# Patient Record
Sex: FEMALE | Race: WHITE | NOT HISPANIC OR LATINO | Employment: OTHER | URBAN - METROPOLITAN AREA
[De-identification: names, ages, dates, MRNs, and addresses within clinical notes are randomized per-mention and may not be internally consistent; named-entity substitution may affect disease eponyms.]

---

## 2021-04-27 NOTE — PROGRESS NOTES
Assessment/Plan:  Here for annual check due to history of breast cancer  C/o vaginal dryness with coitus, rec lubricants (none to date)  Normal breast and pelvic exams, no further paps needed, discussed recommendations  Dexa with PCP  May be due for colonoscopy, will call GI provider for recommendations  Personal history of breast cancer  No change in SBE or CBE  Right mammo order given    Vaginal atrophy  Has not tried lubricants, options given for OTC preparations  Would not encourage vaginal estrogen at this time due to h/o breast cancer       Diagnoses and all orders for this visit:    Personal history of breast cancer    Encounter for gynecological examination (general) (routine) without abnormal findings    Visit for screening mammogram  -     Mammo screening right w 3d & cad; Future    Vaginal atrophy    Other orders  -     aspirin 325 mg tablet; Take 325 mg by mouth daily  -     Omega-3 Fatty Acids (fish oil) 1,000 mg; Take 1,000 mg by mouth daily  -     co-enzyme Q-10 30 MG capsule; Take 30 mg by mouth 3 (three) times a day  -     Cholecalciferol (Vitamin D3) 1 25 MG (86106 UT) CAPS; Take by mouth  -     Fexofenadine HCl (ALLEGRA ALLERGY PO); Take by mouth  -     multivitamin (THERAGRAN) TABS; Take 1 tablet by mouth daily  -     cholestyramine (QUESTRAN) 4 g packet; Take 1 packet by mouth 3 (three) times a day with meals  -     losartan (COZAAR) 25 mg tablet  -     FLUoxetine (PROzac) 20 mg capsule          Subjective:      Patient ID: Jorge Cuevas is a 72 y o  female  HPI Presents for wellness  The following portions of the patient's history were reviewed and updated as appropriate: allergies, current medications, past family history, past medical history, past social history, past surgical history and problem list     Review of Systems  No breast, bladder, bowel changes  No new persistent pain, bloating, early satiety or pelvic pressure  c/o vaginal dryness      Objective:      /80 Ht 5' 0 5" (1 537 m)   Wt 79 4 kg (175 lb)   BMI 33 61 kg/m²          Physical Exam  General appearance: no distress, pleasant  Neck: thyroid without nodules or thyromegaly, no palpable adenopathy  Lymph nodes: no palpable adenopathy  Breasts: no masses, nodes or skin changes  S/p left mastectomy with islas flap reconstruction    Abdomen: soft, non tender, no palpable masses; well healed islas flap scars  Pelvic exam: normal atrophic external genitalia, urethral meatus normal, vagina atrophic without lesions, cervix atrophic without lesions, uterus small, non tender, no adnexal masses, non tender  Rectal exam: normal sphincter tone, no masses, RV confirms above

## 2021-04-28 RX ORDER — FLUOXETINE HYDROCHLORIDE 20 MG/1
CAPSULE ORAL
COMMUNITY
Start: 2021-04-21

## 2021-04-28 RX ORDER — CHOLECALCIFEROL (VITAMIN D3) 1250 MCG
CAPSULE ORAL
COMMUNITY

## 2021-04-28 RX ORDER — DIPHENOXYLATE HYDROCHLORIDE AND ATROPINE SULFATE 2.5; .025 MG/1; MG/1
1 TABLET ORAL DAILY
COMMUNITY

## 2021-04-28 RX ORDER — LOSARTAN POTASSIUM 25 MG/1
TABLET ORAL
COMMUNITY
Start: 2021-04-22

## 2021-04-28 RX ORDER — CHLORAL HYDRATE 500 MG
1000 CAPSULE ORAL DAILY
COMMUNITY
End: 2022-07-18 | Stop reason: ALTCHOICE

## 2021-04-28 RX ORDER — ASPIRIN 325 MG
325 TABLET ORAL DAILY
COMMUNITY
End: 2022-07-18 | Stop reason: ALTCHOICE

## 2021-04-28 RX ORDER — CHOLESTYRAMINE 4 G/9G
1 POWDER, FOR SUSPENSION ORAL
COMMUNITY
End: 2022-07-18 | Stop reason: ALTCHOICE

## 2021-04-29 ENCOUNTER — ANNUAL EXAM (OUTPATIENT)
Dept: OBGYN CLINIC | Facility: CLINIC | Age: 71
End: 2021-04-29
Payer: MEDICARE

## 2021-04-29 VITALS
DIASTOLIC BLOOD PRESSURE: 80 MMHG | SYSTOLIC BLOOD PRESSURE: 130 MMHG | WEIGHT: 175 LBS | HEIGHT: 61 IN | BODY MASS INDEX: 33.04 KG/M2

## 2021-04-29 DIAGNOSIS — Z12.31 VISIT FOR SCREENING MAMMOGRAM: ICD-10-CM

## 2021-04-29 DIAGNOSIS — Z01.419 ENCOUNTER FOR GYNECOLOGICAL EXAMINATION (GENERAL) (ROUTINE) WITHOUT ABNORMAL FINDINGS: ICD-10-CM

## 2021-04-29 DIAGNOSIS — N95.2 VAGINAL ATROPHY: ICD-10-CM

## 2021-04-29 DIAGNOSIS — Z85.3 PERSONAL HISTORY OF BREAST CANCER: Primary | ICD-10-CM

## 2021-04-29 PROCEDURE — G0101 CA SCREEN;PELVIC/BREAST EXAM: HCPCS | Performed by: OBSTETRICS & GYNECOLOGY

## 2021-04-29 NOTE — PATIENT INSTRUCTIONS
Recommend Replens, Astroglide or [place] jelly for vaginal dryness  Last colonoscopy 2011 which would make you due, please call Dr Danelle Alexandre' office to schedule  Mammogram order given

## 2021-04-29 NOTE — ASSESSMENT & PLAN NOTE
Has not tried lubricants, options given for OTC preparations   Would not encourage vaginal estrogen at this time due to h/o breast cancer

## 2021-04-29 NOTE — LETTER
April 29, 2021     Lincoln Sandoval  [address]    Patient: Mariely Talley   YOB: 1950   Date of Visit: 4/29/2021       Dear   [facility],    Thank you for referring Nieves Scanlon to me for evaluation  Below are my notes for this consultation  If you have questions, please do not hesitate to call me  I look forward to following your patient along with you  Sincerely,        Jorge Faust MD        CC: No Recipients  Jorge Faust MD  4/29/2021  1:07 PM  Signed  Assessment/Plan:  Here for annual check due to history of breast cancer  C/o vaginal dryness with coitus, rec lubricants (none to date)  Normal breast and pelvic exams, no further paps needed, discussed recommendations  Dexa with PCP  May be due for colonoscopy, will call GI provider for recommendations  Personal history of breast cancer  No change in SBE or CBE  Right mammo order given    Vaginal atrophy  Has not tried lubricants, options given for OTC preparations  Would not encourage vaginal estrogen at this time due to h/o breast cancer       Diagnoses and all orders for this visit:    Personal history of breast cancer    Encounter for gynecological examination (general) (routine) without abnormal findings    Visit for screening mammogram  -     Mammo screening right w 3d & cad; Future    Vaginal atrophy    Other orders  -     aspirin 325 mg tablet; Take 325 mg by mouth daily  -     Omega-3 Fatty Acids (fish oil) 1,000 mg; Take 1,000 mg by mouth daily  -     co-enzyme Q-10 30 MG capsule; Take 30 mg by mouth 3 (three) times a day  -     Cholecalciferol (Vitamin D3) 1 25 MG (84625 UT) CAPS; Take by mouth  -     Fexofenadine HCl (ALLEGRA ALLERGY PO); Take by mouth  -     multivitamin (THERAGRAN) TABS; Take 1 tablet by mouth daily  -     cholestyramine (QUESTRAN) 4 g packet;  Take 1 packet by mouth 3 (three) times a day with meals  -     losartan (COZAAR) 25 mg tablet  -     FLUoxetine (PROzac) 20 mg capsule          Subjective:      Patient ID: Jorge Cuevas is a 72 y o  female  HPI Presents for wellness  The following portions of the patient's history were reviewed and updated as appropriate: allergies, current medications, past family history, past medical history, past social history, past surgical history and problem list     Review of Systems  No breast, bladder, bowel changes  No new persistent pain, bloating, early satiety or pelvic pressure  c/o vaginal dryness      Objective:      /80   Ht 5' 0 5" (1 537 m)   Wt 79 4 kg (175 lb)   BMI 33 61 kg/m²          Physical Exam  General appearance: no distress, pleasant  Neck: thyroid without nodules or thyromegaly, no palpable adenopathy  Lymph nodes: no palpable adenopathy  Breasts: no masses, nodes or skin changes  S/p left mastectomy with islas flap reconstruction    Abdomen: soft, non tender, no palpable masses; well healed islas flap scars  Pelvic exam: normal atrophic external genitalia, urethral meatus normal, vagina atrophic without lesions, cervix atrophic without lesions, uterus small, non tender, no adnexal masses, non tender  Rectal exam: normal sphincter tone, no masses, RV confirms above

## 2022-07-14 NOTE — PROGRESS NOTES
Assessment/Plan:    Encounter for gynecological examination (general) (routine) without abnormal findings  All well, no complaints  Normal breast and pelvic exams  No further paps indicated, last   Mammo order given  Colonoscopy  per pt report  Dexa due, order given         Diagnoses and all orders for this visit:    Encounter for gynecological examination (general) (routine) without abnormal findings    Personal history of breast cancer  -     Mammo screening bilateral w 3d & cad; Future  -     Ambulatory Referral to Genetics; Future    Encounter for screening mammogram for breast cancer  -     Mammo screening bilateral w 3d & cad; Future    Osteoporosis screening  -     DXA bone density spine hip and pelvis; Future    Asymptomatic menopausal state  -     DXA bone density spine hip and pelvis; Future    Other orders  -     ezetimibe (ZETIA) 10 mg tablet; Take 10 mg by mouth daily          Subjective:      Patient ID: Fadumo Schneider is a 71 y o  female  Here for Medicare biannual breast and pelvic exams  The following portions of the patient's history were reviewed and updated as appropriate: She  has a past medical history of Breast cancer ([facility] [address] ), Hemorrhoids, Mixed hyperlipidemia, and Osteoporosis screening ()  She   Patient Active Problem List    Diagnosis Date Noted    Encounter for gynecological examination (general) (routine) without abnormal findings 2022    Vaginal atrophy 2021    Personal history of breast cancer      She  has a past surgical history that includes Colonoscopy (); Mammo (historical) (2020);  section (); Mastectomy (Left); Tubal ligation (Left); Salpingoophorectomy (Right); and Endometrial ablation w/ novasure    Her family history includes Diabetes in her mother and paternal grandfather; Heart attack in her mother; Hyperlipidemia in her father and mother; Hypertension in her paternal grandmother; Prostate cancer in her father; Skin cancer in her mother; Stroke in her paternal grandmother  She  reports that she has never smoked  She has never used smokeless tobacco  She reports current alcohol use  She reports that she does not use drugs  Current Outpatient Medications   Medication Sig Dispense Refill    Cholecalciferol (Vitamin D3) 1 25 MG (76860 UT) CAPS Take by mouth      co-enzyme Q-10 30 MG capsule Take 30 mg by mouth 3 (three) times a day      ezetimibe (ZETIA) 10 mg tablet Take 10 mg by mouth daily      Fexofenadine HCl (ALLEGRA ALLERGY PO) Take by mouth      FLUoxetine (PROzac) 20 mg capsule       losartan (COZAAR) 25 mg tablet       multivitamin (THERAGRAN) TABS Take 1 tablet by mouth daily       No current facility-administered medications for this visit  She is allergic to imipramine, zocor [simvastatin], and crestor [rosuvastatin]       Review of Systems  No PMB, breast, bladder, bowel changes  No new persistent pain, bloating, early satiety or pelvic pressure      Objective:      /74   Ht 5' 1" (1 549 m)   Wt 77 1 kg (170 lb)   Breastfeeding No   BMI 32 12 kg/m²          Physical Exam    General appearance: no distress, pleasant  Neck: thyroid without nodules or thyromegaly, no palpable adenopathy  Lymph nodes: no palpable adenopathy  Breasts: no masses, nodes or skin changes  S/p left mastectomy with islas flap reconstruction    Abdomen: soft, non tender, no palpable masses; well healed islas flap scars  Pelvic exam: normal atrophic external genitalia, urethral meatus normal, vagina atrophic without lesions, cervix atrophic without lesions, uterus small, non tender, no adnexal masses, non tender  Rectal exam: normal sphincter tone, no masses, RV confirms above

## 2022-07-18 ENCOUNTER — OFFICE VISIT (OUTPATIENT)
Dept: OBGYN CLINIC | Facility: CLINIC | Age: 72
End: 2022-07-18
Payer: MEDICARE

## 2022-07-18 VITALS
WEIGHT: 170 LBS | DIASTOLIC BLOOD PRESSURE: 74 MMHG | HEIGHT: 61 IN | BODY MASS INDEX: 32.1 KG/M2 | SYSTOLIC BLOOD PRESSURE: 126 MMHG

## 2022-07-18 DIAGNOSIS — Z12.31 ENCOUNTER FOR SCREENING MAMMOGRAM FOR BREAST CANCER: ICD-10-CM

## 2022-07-18 DIAGNOSIS — Z85.3 PERSONAL HISTORY OF BREAST CANCER: ICD-10-CM

## 2022-07-18 DIAGNOSIS — Z78.0 ASYMPTOMATIC MENOPAUSAL STATE: ICD-10-CM

## 2022-07-18 DIAGNOSIS — Z01.419 ENCOUNTER FOR GYNECOLOGICAL EXAMINATION (GENERAL) (ROUTINE) WITHOUT ABNORMAL FINDINGS: Primary | ICD-10-CM

## 2022-07-18 DIAGNOSIS — Z13.820 OSTEOPOROSIS SCREENING: ICD-10-CM

## 2022-07-18 PROCEDURE — G0101 CA SCREEN;PELVIC/BREAST EXAM: HCPCS | Performed by: OBSTETRICS & GYNECOLOGY

## 2022-07-18 RX ORDER — EZETIMIBE 10 MG/1
10 TABLET ORAL DAILY
COMMUNITY

## 2022-07-18 NOTE — PATIENT INSTRUCTIONS
Return to office in one year unless having any problems such as breast changes, bleeding, new persistent pain, new progressive bloating, new problems eating (getting full to quickly) or new constant urinary pressure that does not resolve in one week  Call the [facility] line for genetic testing: [facility]: [phone]    Lubricants to consider - Replens, Astroglide or coconut oil    If you are interested in the dilators go to [employer] and look in their product selection

## 2022-07-18 NOTE — ASSESSMENT & PLAN NOTE
All well, no complaints  Normal breast and pelvic exams    No further paps indicated, last 2018  Mammo order given  Colonoscopy 2021 per pt report  Dexa due, order given

## 2022-07-18 NOTE — LETTER
July 18, 2022     [address]    Patient: Karyna Reardon   YOB: 1950   Date of Visit: 7/18/2022       Dear Dr Eric Turk: Thank you for referring Alissa nakul to me for evaluation  Below are my notes for this consultation  If you have questions, please do not hesitate to call me  I look forward to following your patient along with you  Sincerely,        Ashley Simon MD        CC: No Recipients  Ashley Simon MD  7/18/2022 10:03 AM  Incomplete  Assessment/Plan:    Encounter for gynecological examination (general) (routine) without abnormal findings  All well, no complaints  Normal breast and pelvic exams  No further paps indicated, last 2018  Mammo order given  Colonoscopy 2021 per pt report  Dexa due, order given         Diagnoses and all orders for this visit:    Encounter for gynecological examination (general) (routine) without abnormal findings    Personal history of breast cancer  -     Mammo screening bilateral w 3d & cad; Future  -     Ambulatory Referral to Genetics; Future    Encounter for screening mammogram for breast cancer  -     Mammo screening bilateral w 3d & cad; Future    Osteoporosis screening  -     DXA bone density spine hip and pelvis; Future    Asymptomatic menopausal state  -     DXA bone density spine hip and pelvis; Future    Other orders  -     ezetimibe (ZETIA) 10 mg tablet; Take 10 mg by mouth daily          Subjective:      Patient ID: Karyna Reardon is a 71 y o  female  Here for Medicare biannual breast and pelvic exams  The following portions of the patient's history were reviewed and updated as appropriate: She  has a past medical history of Breast cancer ([address] ), Hemorrhoids, Mixed hyperlipidemia, and Osteoporosis screening (2012)    She   Patient Active Problem List    Diagnosis Date Noted    Encounter for gynecological examination (general) (routine) without abnormal findings 07/18/2022    Vaginal atrophy 2021    Personal history of breast cancer      She  has a past surgical history that includes Colonoscopy (); Mammo (historical) (2020);  section (); Mastectomy (Left); Tubal ligation (Left); Salpingoophorectomy (Right); and Endometrial ablation w/ novasure  Her family history includes Diabetes in her mother and paternal grandfather; Heart attack in her mother; Hyperlipidemia in her father and mother; Hypertension in her paternal grandmother; Prostate cancer in her father; Skin cancer in her mother; Stroke in her paternal grandmother  She  reports that she has never smoked  She has never used smokeless tobacco  She reports current alcohol use  She reports that she does not use drugs  Current Outpatient Medications   Medication Sig Dispense Refill    Cholecalciferol (Vitamin D3) 1 25 MG (02403 UT) CAPS Take by mouth      co-enzyme Q-10 30 MG capsule Take 30 mg by mouth 3 (three) times a day      ezetimibe (ZETIA) 10 mg tablet Take 10 mg by mouth daily      Fexofenadine HCl (ALLEGRA ALLERGY PO) Take by mouth      FLUoxetine (PROzac) 20 mg capsule       losartan (COZAAR) 25 mg tablet       multivitamin (THERAGRAN) TABS Take 1 tablet by mouth daily       No current facility-administered medications for this visit  She is allergic to imipramine, zocor [simvastatin], and crestor [rosuvastatin]       Review of Systems  No PMB, breast, bladder, bowel changes  No new persistent pain, bloating, early satiety or pelvic pressure      Objective:      /74   Ht 5' 1" (1 549 m)   Wt 77 1 kg (170 lb)   Breastfeeding No   BMI 32 12 kg/m²          Physical Exam    General appearance: no distress, pleasant  Neck: thyroid without nodules or thyromegaly, no palpable adenopathy  Lymph nodes: no palpable adenopathy  Breasts: no masses, nodes or skin changes  S/p left mastectomy with islas flap reconstruction    Abdomen: soft, non tender, no palpable masses; well healed islas flap scars  Pelvic exam: normal atrophic external genitalia, urethral meatus normal, vagina atrophic without lesions, cervix atrophic without lesions, uterus small, non tender, no adnexal masses, non tender  Rectal exam: normal sphincter tone, no masses, RV confirms above      Megna Dickson MD  2022  9:45 AM  Sign when Signing Visit  Assessment/Plan:    No problem-specific Assessment & Plan notes found for this encounter  Diagnoses and all orders for this visit:    Personal history of breast cancer          Subjective:      Patient ID: Yousuf Chan is a 71 y o  female  Here for Medicare biannual breast and pelvic exams  The following portions of the patient's history were reviewed and updated as appropriate: She  has a past medical history of Breast cancer ([facility] [address] ), Hemorrhoids, Mixed hyperlipidemia, and Osteoporosis screening ()  She   Patient Active Problem List    Diagnosis Date Noted    Vaginal atrophy 2021    Personal history of breast cancer      She  has a past surgical history that includes Colonoscopy (); Mammo (historical) (2020);  section (); Mastectomy (Left); Tubal ligation (Left); Salpingoophorectomy (Right); and Endometrial ablation w/ novasure  Her family history includes Diabetes in her mother and paternal grandfather; Heart attack in her mother; Hyperlipidemia in her father and mother; Hypertension in her paternal grandmother; Prostate cancer in her father; Skin cancer in her mother; Stroke in her paternal grandmother  She  reports that she has never smoked  She has never used smokeless tobacco  She reports current alcohol use  She reports that she does not use drugs    Current Outpatient Medications   Medication Sig Dispense Refill    aspirin 325 mg tablet Take 325 mg by mouth daily      Cholecalciferol (Vitamin D3) 1 25 MG (62664 UT) CAPS Take by mouth      cholestyramine (QUESTRAN) 4 g packet Take 1 packet by mouth 3 (three) times a day with meals      co-enzyme Q-10 30 MG capsule Take 30 mg by mouth 3 (three) times a day      Fexofenadine HCl (ALLEGRA ALLERGY PO) Take by mouth      FLUoxetine (PROzac) 20 mg capsule       losartan (COZAAR) 25 mg tablet       multivitamin (THERAGRAN) TABS Take 1 tablet by mouth daily      Omega-3 Fatty Acids (fish oil) 1,000 mg Take 1,000 mg by mouth daily       No current facility-administered medications for this visit  She is allergic to imipramine, zocor [simvastatin], and crestor [rosuvastatin]       Review of Systems  No PMB, breast, bladder, bowel changes  No new persistent pain, bloating, early satiety or pelvic pressure      Objective: There were no vitals taken for this visit  Physical Exam    General appearance: no distress, pleasant  Neck: thyroid without nodules or thyromegaly, no palpable adenopathy  Lymph nodes: no palpable adenopathy  Breasts: no masses, nodes or skin changes  S/p left mastectomy with islas flap reconstruction    Abdomen: soft, non tender, no palpable masses; well healed islas flap scars  Pelvic exam: normal atrophic external genitalia, urethral meatus normal, vagina atrophic without lesions, cervix atrophic without lesions, uterus small, non tender, no adnexal masses, non tender  Rectal exam: normal sphincter tone, no masses, RV confirms above

## 2022-07-20 ENCOUNTER — TELEPHONE (OUTPATIENT)
Dept: GENETICS | Facility: CLINIC | Age: 72
End: 2022-07-20

## 2022-07-20 NOTE — TELEPHONE ENCOUNTER
I called Nba Wright to schedule a new patient appointment with the [facility]       Outcome:   Patient hung up the phone    Follow-up:   At this time the referral will be closed and we will wait to hear back from the patient regarding scheduling this appointment

## 2022-09-02 DIAGNOSIS — Z78.0 ASYMPTOMATIC MENOPAUSAL STATE: ICD-10-CM

## 2022-09-02 DIAGNOSIS — Z13.820 OSTEOPOROSIS SCREENING: ICD-10-CM

## 2022-09-06 ENCOUNTER — TELEPHONE (OUTPATIENT)
Dept: OBGYN CLINIC | Facility: CLINIC | Age: 72
End: 2022-09-06

## 2022-09-06 NOTE — TELEPHONE ENCOUNTER
Please inform of dexa with some decline in the hip with osteopenia, still with low risk of fracture  Continue to strive for total calcium intake of 1200 mg and vitamin D of 1000 IU in combined dietary and supplement forms  You can only absorb 600 mg of calcium at a time  Avoid excess calcium as this may adversely effect the arteries in the heart  I would repeat the test in 3-5 years       (6% increase in spine T0 2; 7% decline in hip T-0 6; left fem neck T-1 9  2 9% 10 year hip fx risk)

## 2022-09-07 DIAGNOSIS — Z12.31 ENCOUNTER FOR SCREENING MAMMOGRAM FOR BREAST CANCER: ICD-10-CM

## 2022-09-07 DIAGNOSIS — Z85.3 PERSONAL HISTORY OF BREAST CANCER: ICD-10-CM

## 2022-11-04 PROBLEM — Z13.71 BRCA NEGATIVE: Status: ACTIVE | Noted: 2022-11-04

## 2023-07-27 NOTE — PROGRESS NOTES
Assessment/Plan:    Personal history of left breast cancer - 2002, mastectomy   Here for annual breast and gyn exams for personal history of breast cancer. All well, no complaints aside from vague bloating. Normal breast and pelvic exams. No evidence of abdominal bloating. Last pap 5/2018 neg; no further indicated  Mammo order given, last 8/31/22  Colonoscopy 2021 per pt report, # given for provider to clarify when follow up indicated. Osteopenia of neck of left femur  Dexa 8/31/22 6% increase in spine T0.2; 7% decline in hip T-0.6; left fem neck T-1.9, 2.9% 10 year hip fx risk. Calcium recs reviewed, repeat 2025       Diagnoses and all orders for this visit:    Personal history of breast cancer  -     Mammo screening right w 3d & cad; Future    BRCA negative 9/2022 [facility] Myrisk panel negative    Vaginal atrophy    Encounter for screening mammogram for malignant neoplasm of breast  -     Cancel: Mammo screening bilateral w 3d & cad; Future  -     Mammo screening right w 3d & cad; Future    History of left mastectomy  -     Mammo screening right w 3d & cad; Future    Other orders  -     calcium carbonate (OS-SILVANA) 1250 (500 Ca) MG tablet; Take 1 tablet by mouth daily          Subjective:      Patient ID: Brian Flores is a 68 y.o. female. HPI Here for annual gyn and breast exam.      The following portions of the patient's history were reviewed and updated as appropriate:   She  has a past medical history of Breast cancer ([address]), Hemorrhoids, Mixed hyperlipidemia, and Osteoporosis screening (2012).   She   Patient Active Problem List    Diagnosis Date Noted   • Osteopenia of neck of left femur 07/31/2023   • BRCA negative 9/2022 [facility] Myrisk panel negative 11/04/2022   • Encounter for gynecological examination (general) (routine) without abnormal findings 07/18/2022   • Vaginal atrophy 04/29/2021   • Personal history of left breast cancer - 2002, mastectomy  2002     She  has a past surgical history that includes Colonoscopy (); Mammo (historical) (2020);  section (); Mastectomy (Left); Tubal ligation (Left); Salpingoophorectomy (Right); and Endometrial ablation w/ novasure. Her family history includes Diabetes in her mother and paternal grandfather; Heart attack in her mother; Hyperlipidemia in her father and mother; Hypertension in her paternal grandmother; Prostate cancer in her father; Skin cancer in her mother; Stroke in her paternal grandmother. She  reports that she has never smoked. She has never used smokeless tobacco. She reports current alcohol use. She reports that she does not use drugs. Current Outpatient Medications   Medication Sig Dispense Refill   • calcium carbonate (OS-SILVANA) 1250 (500 Ca) MG tablet Take 1 tablet by mouth daily     • Cholecalciferol (Vitamin D3) 1.25 MG (07490 UT) CAPS Take by mouth     • co-enzyme Q-10 30 MG capsule Take 30 mg by mouth 3 (three) times a day     • ezetimibe (ZETIA) 10 mg tablet Take 10 mg by mouth daily     • Fexofenadine HCl (ALLEGRA ALLERGY PO) Take by mouth     • FLUoxetine (PROzac) 20 mg capsule      • losartan (COZAAR) 25 mg tablet      • multivitamin (THERAGRAN) TABS Take 1 tablet by mouth daily       No current facility-administered medications for this visit. She is allergic to imipramine, zocor [simvastatin], and crestor [rosuvastatin]. .    Review of Systems  No PMB, breast, bladder, bowel changes.  No new persistent pain, bloating, early satiety or pelvic pressure      Objective:      /82 (BP Location: Left arm, Patient Position: Sitting, Cuff Size: Standard)   Ht 5' 1.25" (1.556 m)   Wt 78.8 kg (173 lb 12.8 oz)   Breastfeeding No   BMI 32.57 kg/m²          Physical Exam    General appearance: no distress, pleasant  Neck: thyroid without nodules or thyromegaly, no palpable adenopathy  Lymph nodes: no palpable adenopathy  Breasts: s/p left mastectomy with reconstruction, no masses, nodes or skin changes  Abdomen: soft, non tender, no palpable masses, no bloating, well healed islas flap scars  Pelvic exam: normal atrophic external genitalia, urethral meatus normal, vagina atrophic without lesions, cervix atrophic without lesions, uterus small, non tender, no adnexal masses, non tender  Rectal exam: normal sphincter tone, no masses, RV confirms above

## 2023-07-31 ENCOUNTER — ANNUAL EXAM (OUTPATIENT)
Dept: OBGYN CLINIC | Facility: CLINIC | Age: 73
End: 2023-07-31
Payer: MEDICARE

## 2023-07-31 VITALS
DIASTOLIC BLOOD PRESSURE: 82 MMHG | SYSTOLIC BLOOD PRESSURE: 136 MMHG | WEIGHT: 173.8 LBS | BODY MASS INDEX: 32.81 KG/M2 | HEIGHT: 61 IN

## 2023-07-31 DIAGNOSIS — Z13.71 BRCA NEGATIVE: ICD-10-CM

## 2023-07-31 DIAGNOSIS — Z85.3 PERSONAL HISTORY OF BREAST CANCER: Primary | ICD-10-CM

## 2023-07-31 DIAGNOSIS — Z90.12 HISTORY OF LEFT MASTECTOMY: ICD-10-CM

## 2023-07-31 DIAGNOSIS — N95.2 VAGINAL ATROPHY: ICD-10-CM

## 2023-07-31 DIAGNOSIS — Z12.31 ENCOUNTER FOR SCREENING MAMMOGRAM FOR MALIGNANT NEOPLASM OF BREAST: ICD-10-CM

## 2023-07-31 PROBLEM — M85.852 OSTEOPENIA OF NECK OF LEFT FEMUR: Status: ACTIVE | Noted: 2023-07-31

## 2023-07-31 PROCEDURE — G0101 CA SCREEN;PELVIC/BREAST EXAM: HCPCS | Performed by: OBSTETRICS & GYNECOLOGY

## 2023-07-31 RX ORDER — IBUPROFEN 200 MG
1 CAPSULE ORAL DAILY
COMMUNITY

## 2023-07-31 NOTE — PATIENT INSTRUCTIONS
Return to office in one year unless having any problems such as breast changes, bleeding, new persistent pain, new progressive bloating, new problems eating (getting full to quickly) or new constant urinary pressure that does not resolve in one week. Call in six months to schedule your annual visit. Call Dr Gerson Keating' office to see when you are due for the colonoscopy . Continue to strive for 1200 mg of calcium and 1000 IU of vitamin D daily in diet and supplements combined for your bone health. You can only absorb 600 mg of calcium at a time. Avoid excess calcium as this may adversely effect your heart. There are 400 mg of calcium in an 8 oz serving of dairy.

## 2023-07-31 NOTE — ASSESSMENT & PLAN NOTE
Here for annual breast and gyn exams for personal history of breast cancer. All well, no complaints aside from vague bloating. Normal breast and pelvic exams. No evidence of abdominal bloating. Last pap 5/2018 neg; no further indicated  Mammo order given, last 8/31/22  Colonoscopy 2021 per pt report, # given for provider to clarify when follow up indicated.

## 2023-07-31 NOTE — ASSESSMENT & PLAN NOTE
Dexa 8/31/22 6% increase in spine T0.2; 7% decline in hip T-0.6; left fem neck T-1.9, 2.9% 10 year hip fx risk.    Calcium recs reviewed, repeat 2025

## 2023-07-31 NOTE — LETTER
July 31, 2023     DO Socorro Whitaker  [address]    Patient: Albert Vásquez   YOB: 1950   Date of Visit: 7/31/2023       Dear Dr. Garret Sloan: Thank you for referring Juvenal Eller to me for evaluation. Below are my notes for this consultation. If you have questions, please do not hesitate to call me. I look forward to following your patient along with you. Sincerely,        Rodriguez De Leon MD        CC: No Recipients    Rodriguez De Leon MD  7/31/2023 12:02 PM  Sign when Signing Visit  Assessment/Plan:    Personal history of left breast cancer - 2002, mastectomy   Here for annual breast and gyn exams for personal history of breast cancer. All well, no complaints aside from vague bloating. Normal breast and pelvic exams. No evidence of abdominal bloating. Last pap 5/2018 neg; no further indicated  Mammo order given, last 8/31/22  Colonoscopy 2021 per pt report, # given for provider to clarify when follow up indicated. Osteopenia of neck of left femur  Dexa 8/31/22 6% increase in spine T0.2; 7% decline in hip T-0.6; left fem neck T-1.9, 2.9% 10 year hip fx risk. Calcium recs reviewed, repeat 2025      Diagnoses and all orders for this visit:    Personal history of breast cancer  -     Mammo screening right w 3d & cad; Future    BRCA negative 9/2022 [facility] negative    Vaginal atrophy    Encounter for screening mammogram for malignant neoplasm of breast  -     Cancel: Mammo screening bilateral w 3d & cad; Future  -     Mammo screening right w 3d & cad; Future    History of left mastectomy  -     Mammo screening right w 3d & cad; Future    Other orders  -     calcium carbonate (OS-SILVANA) 1250 (500 Ca) MG tablet; Take 1 tablet by mouth daily         Subjective:     Patient ID: Albert Vásquez is a 68 y.o. female.     HPI Here for annual gyn and breast exam.      The following portions of the patient's history were reviewed and updated as appropriate: She  has a past medical history of Breast cancer ([address]), Hemorrhoids, Mixed hyperlipidemia, and Osteoporosis screening (). She   Patient Active Problem List    Diagnosis Date Noted   • Osteopenia of neck of left femur 2023   • BRCA negative 2022 [facility] My[facility] panel negative 2022   • Encounter for gynecological examination (general) (routine) without abnormal findings 2022   • Vaginal atrophy 2021   • Personal history of left breast cancer - , mastectomy       She  has a past surgical history that includes Colonoscopy (); Mammo (historical) (2020);  section (); Mastectomy (Left); Tubal ligation (Left); Salpingoophorectomy (Right); and Endometrial ablation w/ novasure. Her family history includes Diabetes in her mother and paternal grandfather; Heart attack in her mother; Hyperlipidemia in her father and mother; Hypertension in her paternal grandmother; Prostate cancer in her father; Skin cancer in her mother; Stroke in her paternal grandmother. She  reports that she has never smoked. She has never used smokeless tobacco. She reports current alcohol use. She reports that she does not use drugs. Current Outpatient Medications   Medication Sig Dispense Refill   • calcium carbonate (OS-SILVANA) 1250 (500 Ca) MG tablet Take 1 tablet by mouth daily     • Cholecalciferol (Vitamin D3) 1.25 MG (92130 UT) CAPS Take by mouth     • co-enzyme Q-10 30 MG capsule Take 30 mg by mouth 3 (three) times a day     • ezetimibe (ZETIA) 10 mg tablet Take 10 mg by mouth daily     • Fexofenadine HCl (ALLEGRA ALLERGY PO) Take by mouth     • FLUoxetine (PROzac) 20 mg capsule      • losartan (COZAAR) 25 mg tablet      • multivitamin (THERAGRAN) TABS Take 1 tablet by mouth daily       No current facility-administered medications for this visit. She is allergic to imipramine, zocor [simvastatin], and crestor [rosuvastatin]. .    Review of Systems No PMB, breast, bladder, bowel changes.  No new persistent pain, bloating, early satiety or pelvic pressure      Objective:      /82 (BP Location: Left arm, Patient Position: Sitting, Cuff Size: Standard)   Ht 5' 1.25" (1.556 m)   Wt 78.8 kg (173 lb 12.8 oz)   Breastfeeding No   BMI 32.57 kg/m²         Physical Exam   General appearance: no distress, pleasant  Neck: thyroid without nodules or thyromegaly, no palpable adenopathy  Lymph nodes: no palpable adenopathy  Breasts: s/p left mastectomy with reconstruction, no masses, nodes or skin changes  Abdomen: soft, non tender, no palpable masses, no bloating, well healed islas flap scars  Pelvic exam: normal atrophic external genitalia, urethral meatus normal, vagina atrophic without lesions, cervix atrophic without lesions, uterus small, non tender, no adnexal masses, non tender  Rectal exam: normal sphincter tone, no masses, RV confirms above

## 2023-09-07 DIAGNOSIS — Z85.3 PERSONAL HISTORY OF BREAST CANCER: ICD-10-CM

## 2023-09-07 DIAGNOSIS — Z12.31 ENCOUNTER FOR SCREENING MAMMOGRAM FOR MALIGNANT NEOPLASM OF BREAST: ICD-10-CM

## 2023-09-07 DIAGNOSIS — Z90.12 HISTORY OF LEFT MASTECTOMY: ICD-10-CM

## 2023-09-12 ENCOUNTER — TELEPHONE (OUTPATIENT)
Dept: OBGYN CLINIC | Facility: CLINIC | Age: 73
End: 2023-09-12

## 2023-09-12 NOTE — TELEPHONE ENCOUNTER
Patient called requesting to speak with Dr. Rickey Florence regarding her call back right breast ultrasound which she had done today at [facility] about an hour ago. The radiologist did speak with her right after and informed that they see a 6mm mass on her right breast and recommend right breast guided ultrasound biopsy. However the radiologist informed Blairjanelle Nick to discuss finding with Dr. Rickey Florence to see if Dr. Rickey Florence would like patient to have this done w/ a breast surgeon or proceed at [facility] with the radiologist. Linda Jf that we had not received the results yet which is most likely currently being faxed over and pending to be scan into her chart. Informed Genet Romero will forward this conversation to Dr. Rickey Florence regarding her request to speak with Dr. Rickey Florence due to being nervous about this process w/ her personal history of left breast cancer. Good call back # is her cell phone which is [phone]. She states she will have her cell phone on her all day today. Dr. Rickey Florence please review and advise.

## 2023-09-12 NOTE — TELEPHONE ENCOUNTER
Spoke with Isiah Yarbrough. I recommend evaluation by breast surgeon who can order the biopsy if/as indicated. With h/o breast cancer in past, would be beneficial to have breast care expert to dictated care. Agrees to plan. Dr Claudio Talley contact information given.

## 2023-09-26 ENCOUNTER — TELEPHONE (OUTPATIENT)
Dept: OBGYN CLINIC | Facility: CLINIC | Age: 73
End: 2023-09-26

## 2023-09-26 NOTE — TELEPHONE ENCOUNTER
Called home # and spoke to . Otilia Tobias is scheduled tomorrow with breast surgeon, Dr Christina Hull for the positive right breast pathology. S/p left mastectomy 2002. Support offered if needed.

## 2023-11-27 ENCOUNTER — TELEPHONE (OUTPATIENT)
Dept: OBGYN CLINIC | Facility: CLINIC | Age: 73
End: 2023-11-27

## 2023-11-27 NOTE — TELEPHONE ENCOUNTER
Patient left v/m requesting the date of her last dexa scan. Left v/m for patient to call back to review results and date of last dexa scan.

## 2023-12-12 ENCOUNTER — HOSPITAL ENCOUNTER (OUTPATIENT)
Dept: HOSPITAL 99 - RPT | Age: 73
Discharge: HOME | End: 2023-12-12
Payer: MEDICARE

## 2023-12-12 DIAGNOSIS — C50.511: Primary | ICD-10-CM

## 2023-12-12 DIAGNOSIS — Z98.890: ICD-10-CM

## 2023-12-12 DIAGNOSIS — Z17.0: ICD-10-CM

## 2024-02-21 PROBLEM — Z01.419 ENCOUNTER FOR GYNECOLOGICAL EXAMINATION (GENERAL) (ROUTINE) WITHOUT ABNORMAL FINDINGS: Status: RESOLVED | Noted: 2022-07-18 | Resolved: 2024-02-21

## 2024-06-03 ENCOUNTER — TELEPHONE (OUTPATIENT)
Age: 74
End: 2024-06-03

## 2024-09-11 ENCOUNTER — HOSPITAL ENCOUNTER (OUTPATIENT)
Dept: HOSPITAL 99 - WDC | Age: 74
End: 2024-09-11
Payer: MEDICARE

## 2024-09-11 DIAGNOSIS — Z85.3: ICD-10-CM

## 2024-09-11 DIAGNOSIS — Z12.31: Primary | ICD-10-CM

## 2024-09-11 DIAGNOSIS — C50.511: ICD-10-CM

## 2024-09-24 ENCOUNTER — TELEPHONE (OUTPATIENT)
Dept: OBGYN CLINIC | Facility: CLINIC | Age: 74
End: 2024-09-24

## 2024-09-24 NOTE — TELEPHONE ENCOUNTER
POD scheduled pt for 11/11. (I did ask for pt to be transferred to me - but they didn't).  Is that ok?  Or should I call her back

## 2024-09-24 NOTE — TELEPHONE ENCOUNTER
----- Message from Felicitas Jimenez MD sent at 9/24/2024  7:42 AM EDT -----  Regarding: Overdue for well check, please call  Please call ptshannon for Medicare biannual check. With her h/o breast cancer, she should be scheduled in the near future. Thanks.

## 2024-09-24 NOTE — TELEPHONE ENCOUNTER
LMOM on home phone (marked as preferred method of contact).  Requested pt to call to make off-year Medicare wellness with Dr Jimenez.    PLEASE TRANSFER CALL TO SHANNEN AT [facility]

## 2024-09-27 ENCOUNTER — HOSPITAL ENCOUNTER (OUTPATIENT)
Dept: HOSPITAL 99 - RAD | Age: 74
End: 2024-09-27
Payer: MEDICARE

## 2024-09-27 DIAGNOSIS — M85.89: Primary | ICD-10-CM

## 2024-11-08 NOTE — PROGRESS NOTES
Assessment/Plan:    Encounter for gynecological examination (general) (routine) without abnormal findings  Here for well check, s/p right lumpectomy with Dr Casarez and XRT at . On Arimidex.  No breast or gyn complaints.   Normal breast and pelvic exams.  Last pap , no further indicated  Mammo order given, last 24 BIRADS 2C  Colonoscopy , polyp; due   Dexa 10/2024, followed by Dr Cai at . Calcium recs reviewed.       Diagnoses and all orders for this visit:    Encounter for screening mammogram for malignant neoplasm of breast  -     Cancel: Mammo screening bilateral w 3d and cad; Future  -     Mammo screening right w 3d and cad; Future    Encounter for gynecological examination (general) (routine) without abnormal findings    Personal history of bilateral breast cancer - , left mastectomy ; 2023 right lumpectomy, XRT    BRCA negative 2022 [facility] Myrisk panel negative    Other orders  -     anastrozole (ARIMIDEX) 1 mg tablet        Subjective:      Patient ID: Veda Jurado is a 74 y.o. female.    HPI Here for Medicare biannual breast and pelvic exams; h/o breast cancer    The following portions of the patient's history were reviewed and updated as appropriate: She  has a past medical history of Breast cancer (HCC), Breast cancer (HCC) (), Hemorrhoids, Mixed hyperlipidemia, Osteoporosis screening (), and Skin cancer.  She   Patient Active Problem List    Diagnosis Date Noted    Osteopenia of neck of left femur 2023    BRCA negative 2022 [facility] Myrisk panel negative 2022    Vaginal atrophy 2021    Personal history of bilateral breast cancer - , left mastectomy ; 2023 right lumpectomy, XRT      She  has a past surgical history that includes Colonoscopy (); Mammo (historical) (2020);  section (); Mastectomy (Left); Tubal ligation (Left); Salpingoophorectomy (Right); Endometrial ablation w/ novasure; Mastectomy (Left, );  "and Breast lumpectomy (2024).  Her family history includes Diabetes in her mother and paternal grandfather; Heart attack in her mother; Hyperlipidemia in her father and mother; Hypertension in her paternal grandmother; Prostate cancer in her father; Skin cancer in her mother; Stroke in her paternal grandmother.  She  reports that she has never smoked. She has never been exposed to tobacco smoke. She has never used smokeless tobacco. She reports current alcohol use. She reports that she does not use drugs.  Current Outpatient Medications   Medication Sig Dispense Refill    anastrozole (ARIMIDEX) 1 mg tablet       calcium carbonate (OS-SILVANA) 1250 (500 Ca) MG tablet Take 1 tablet by mouth daily      Cholecalciferol (Vitamin D3) 1.25 MG (19208 UT) CAPS Take by mouth      co-enzyme Q-10 30 MG capsule Take 30 mg by mouth 3 (three) times a day      ezetimibe (ZETIA) 10 mg tablet Take 10 mg by mouth daily      Fexofenadine HCl (ALLEGRA ALLERGY PO) Take by mouth      FLUoxetine (PROzac) 20 mg capsule       losartan (COZAAR) 25 mg tablet       multivitamin (THERAGRAN) TABS Take 1 tablet by mouth daily       No current facility-administered medications for this visit.     She is allergic to imipramine, zocor [simvastatin], and crestor [rosuvastatin]..    Review of Systems  No PMB, breast, bladder, bowel changes. No new persistent pain, bloating, early satiety or pelvic pressure    Objective:    /86 (BP Location: Left arm, Patient Position: Sitting, Cuff Size: Standard)   Ht 5' 1.25\" (1.556 m)   Wt 78.8 kg (173 lb 12.8 oz)   BMI 32.57 kg/m²      Physical Exam    General appearance: no distress, pleasant  Neck: thyroid without nodules or thyromegaly, no palpable adenopathy  Lymph nodes: no palpable adenopathy  Breasts: s/p left mastectomy with reconstruction and right UOQ lumpectomy with XRT. No masses, nodes or skin changes bilaterally  Abdomen: soft, non tender, no palpable masses  Pelvic exam: normal atrophic external " genitalia, urethral meatus normal, vagina atrophic without lesions, cervix atrophic without lesions, uterus small, non tender, no adnexal masses, non tender  Rectal exam: normal sphincter tone, no masses, RV confirms above

## 2024-11-11 ENCOUNTER — ANNUAL EXAM (OUTPATIENT)
Dept: OBGYN CLINIC | Facility: CLINIC | Age: 74
End: 2024-11-11
Payer: MEDICARE

## 2024-11-11 VITALS
BODY MASS INDEX: 32.81 KG/M2 | HEIGHT: 61 IN | DIASTOLIC BLOOD PRESSURE: 86 MMHG | SYSTOLIC BLOOD PRESSURE: 148 MMHG | WEIGHT: 173.8 LBS

## 2024-11-11 DIAGNOSIS — Z01.419 ENCOUNTER FOR GYNECOLOGICAL EXAMINATION (GENERAL) (ROUTINE) WITHOUT ABNORMAL FINDINGS: Primary | ICD-10-CM

## 2024-11-11 DIAGNOSIS — Z12.31 ENCOUNTER FOR SCREENING MAMMOGRAM FOR MALIGNANT NEOPLASM OF BREAST: ICD-10-CM

## 2024-11-11 DIAGNOSIS — Z13.71 BRCA NEGATIVE: ICD-10-CM

## 2024-11-11 DIAGNOSIS — Z85.3 PERSONAL HISTORY OF BREAST CANCER: ICD-10-CM

## 2024-11-11 PROCEDURE — G0101 CA SCREEN;PELVIC/BREAST EXAM: HCPCS | Performed by: OBSTETRICS & GYNECOLOGY

## 2024-11-11 RX ORDER — ANASTROZOLE 1 MG/1
TABLET ORAL
COMMUNITY
Start: 2024-02-14

## 2024-11-11 NOTE — PATIENT INSTRUCTIONS
Return to office in one year unless having any problems such as breast changes, bleeding, new persistent pain, new progressive bloating, new problems eating (getting full to quickly) or new constant urinary pressure that does not resolve in one week.    Continue to strive for 1200 mg of calcium and 1000 IU of vitamin D daily in diet and supplements combined for your bone health.  You can only absorb 600 mg of calcium at a time. Avoid excess calcium as this may adversely effect your heart.  There are 400 mg of calcium in an 8 oz serving of dairy.  [place] milk has 600 mg of calcium in an 8 oz serving.

## 2024-11-11 NOTE — ASSESSMENT & PLAN NOTE
Here for well check, s/p right lumpectomy with Dr Casarez and XRT at . On Arimidex.  No breast or gyn complaints.   Normal breast and pelvic exams.  Last pap 2018, no further indicated  Mammo order given, last 9/11/24 BIRADS 2C  Colonoscopy 2024, polyp; due 2029  Dexa 10/2024, followed by Dr Cai at . Calcium recs reviewed.

## 2024-11-11 NOTE — LETTER
November 11, 2024     Freda Cunningham, DO  [address]  [address]  [facility] [address]    Patient: Veda Jurado   YOB: 1950   Date of Visit: 11/11/2024       Dear Dr. Cunningham:    Thank you for referring Veda Jurado to me for evaluation. Below are my notes for this consultation.    If you have questions, please do not hesitate to call me. I look forward to following your patient along with you.         Sincerely,        Felicitas Jimenez MD        CC: No Recipients    Felicitas Jimenez MD  11/11/2024  1:08 PM  Sign when Signing Visit  Assessment/Plan:    Encounter for gynecological examination (general) (routine) without abnormal findings  Here for well check, s/p right lumpectomy with Dr Casarez and XRT at . On Arimidex.  No breast or gyn complaints.   Normal breast and pelvic exams.  Last pap 2018, no further indicated  Mammo order given, last 9/11/24 BIRADS 2C  Colonoscopy 2024, polyp; due 2029  Dexa 10/2024, followed by Dr Cai at . Calcium recs reviewed.       Diagnoses and all orders for this visit:    Encounter for screening mammogram for malignant neoplasm of breast  -     Cancel: Mammo screening bilateral w 3d and cad; Future  -     Mammo screening right w 3d and cad; Future    Encounter for gynecological examination (general) (routine) without abnormal findings    Personal history of bilateral breast cancer - 2002, left mastectomy ; 11/2023 right lumpectomy, XRT    BRCA negative 9/2022 [facility] panel negative    Other orders  -     anastrozole (ARIMIDEX) 1 mg tablet        Subjective:      Patient ID: Veda Jurado is a 74 y.o. female.    HPI Here for Medicare biannual breast and pelvic exams; h/o breast cancer    The following portions of the patient's history were reviewed and updated as appropriate: She  has a past medical history of Breast cancer (HCC), Breast cancer (HCC) (2024), Hemorrhoids, Mixed hyperlipidemia, Osteoporosis screening (2012), and Skin cancer.  She   Patient Active  Problem List    Diagnosis Date Noted   • Osteopenia of neck of left femur 2023   • BRCA negative 2022 [facility] Myrisk panel negative 2022   • Vaginal atrophy 2021   • Personal history of bilateral breast cancer - , left mastectomy ; 2023 right lumpectomy, XRT      She  has a past surgical history that includes Colonoscopy (); Mammo (historical) (2020);  section (); Mastectomy (Left); Tubal ligation (Left); Salpingoophorectomy (Right); Endometrial ablation w/ novasure; Mastectomy (Left, ); and Breast lumpectomy ().  Her family history includes Diabetes in her mother and paternal grandfather; Heart attack in her mother; Hyperlipidemia in her father and mother; Hypertension in her paternal grandmother; Prostate cancer in her father; Skin cancer in her mother; Stroke in her paternal grandmother.  She  reports that she has never smoked. She has never been exposed to tobacco smoke. She has never used smokeless tobacco. She reports current alcohol use. She reports that she does not use drugs.  Current Outpatient Medications   Medication Sig Dispense Refill   • anastrozole (ARIMIDEX) 1 mg tablet      • calcium carbonate (OS-SILVANA) 1250 (500 Ca) MG tablet Take 1 tablet by mouth daily     • Cholecalciferol (Vitamin D3) 1.25 MG (85161 UT) CAPS Take by mouth     • co-enzyme Q-10 30 MG capsule Take 30 mg by mouth 3 (three) times a day     • ezetimibe (ZETIA) 10 mg tablet Take 10 mg by mouth daily     • Fexofenadine HCl (ALLEGRA ALLERGY PO) Take by mouth     • FLUoxetine (PROzac) 20 mg capsule      • losartan (COZAAR) 25 mg tablet      • multivitamin (THERAGRAN) TABS Take 1 tablet by mouth daily       No current facility-administered medications for this visit.     She is allergic to imipramine, zocor [simvastatin], and crestor [rosuvastatin]..    Review of Systems  No PMB, breast, bladder, bowel changes. No new persistent pain, bloating, early satiety or pelvic  "pressure    Objective:    /86 (BP Location: Left arm, Patient Position: Sitting, Cuff Size: Standard)   Ht 5' 1.25\" (1.556 m)   Wt 78.8 kg (173 lb 12.8 oz)   BMI 32.57 kg/m²      Physical Exam    General appearance: no distress, pleasant  Neck: thyroid without nodules or thyromegaly, no palpable adenopathy  Lymph nodes: no palpable adenopathy  Breasts: s/p left mastectomy with reconstruction and right UOQ lumpectomy with XRT. No masses, nodes or skin changes bilaterally  Abdomen: soft, non tender, no palpable masses  Pelvic exam: normal atrophic external genitalia, urethral meatus normal, vagina atrophic without lesions, cervix atrophic without lesions, uterus small, non tender, no adnexal masses, non tender  Rectal exam: normal sphincter tone, no masses, RV confirms above    "

## 2025-07-24 DIAGNOSIS — F33.42 RECURRENT MAJOR DEPRESSIVE DISORDER, IN FULL REMISSION (HCC): Primary | ICD-10-CM

## 2025-07-24 NOTE — TELEPHONE ENCOUNTER
Reason for call:   [x] Refill   [] Prior Auth  [] Other:     Office:   [x] PCP/Provider -   [] Specialty/Provider -     Medication:   -Fluoxetine 20 mg capsules 1 capsule by mouth daily         Pharmacy[place]  [facility] [place]    Local Pharmacy   Does the patient have enough for 3 days?   [x] Yes   [] No - Send as HP to POD    Mail Away Pharmacy   Does the patient have enough for 10 days?   [] Yes   [] No - Send as HP to POD          Verified with [employer].

## 2025-08-08 ENCOUNTER — OFFICE VISIT (OUTPATIENT)
Age: 75
End: 2025-08-08
Payer: MEDICARE

## 2025-08-08 VITALS
HEART RATE: 83 BPM | OXYGEN SATURATION: 97 % | BODY MASS INDEX: 31.22 KG/M2 | TEMPERATURE: 98.1 F | DIASTOLIC BLOOD PRESSURE: 80 MMHG | SYSTOLIC BLOOD PRESSURE: 124 MMHG | WEIGHT: 176.2 LBS | HEIGHT: 63 IN

## 2025-08-08 DIAGNOSIS — S99.921A INJURY OF TOE ON RIGHT FOOT, INITIAL ENCOUNTER: Primary | ICD-10-CM

## 2025-08-08 PROBLEM — Z17.0 MALIGNANT NEOPLASM OF LOWER-OUTER QUADRANT OF RIGHT BREAST OF FEMALE, ESTROGEN RECEPTOR POSITIVE (HCC): Status: ACTIVE | Noted: 2023-11-29

## 2025-08-08 PROBLEM — C50.511 MALIGNANT NEOPLASM OF LOWER-OUTER QUADRANT OF RIGHT BREAST OF FEMALE, ESTROGEN RECEPTOR POSITIVE (HCC): Status: ACTIVE | Noted: 2023-11-29

## 2025-08-08 PROCEDURE — G2211 COMPLEX E/M VISIT ADD ON: HCPCS | Performed by: NURSE PRACTITIONER

## 2025-08-08 PROCEDURE — 99213 OFFICE O/P EST LOW 20 MIN: CPT | Performed by: NURSE PRACTITIONER

## 2025-08-14 PROBLEM — J06.9 URI (UPPER RESPIRATORY INFECTION): Status: ACTIVE | Noted: 2025-08-14

## 2025-08-14 PROBLEM — M19.079 ARTHRITIS OF FOOT: Status: ACTIVE | Noted: 2025-08-14

## 2025-08-14 PROBLEM — D05.10 DUCTAL CARCINOMA IN SITU OF BREAST: Status: ACTIVE | Noted: 2025-08-14

## 2025-08-14 PROBLEM — L25.9 CONTACT DERMATITIS: Status: ACTIVE | Noted: 2025-08-14

## 2025-08-14 PROBLEM — F32.5 MAJOR DEPRESSIVE DISORDER IN FULL REMISSION (HCC): Status: ACTIVE | Noted: 2025-08-14

## 2025-08-14 PROBLEM — K62.0 ANAL POLYP: Status: ACTIVE | Noted: 2025-08-14

## 2025-08-14 PROBLEM — E66.811 OBESITY (BMI 30.0-34.9): Status: ACTIVE | Noted: 2025-08-14

## 2025-08-14 PROBLEM — K64.8 INTERNAL HEMORRHOID: Status: ACTIVE | Noted: 2025-08-14

## 2025-08-14 PROBLEM — M15.9 OSTEOARTHRITIS INVOLVING MULTIPLE JOINTS ON BOTH SIDES OF BODY: Status: ACTIVE | Noted: 2025-08-14

## 2025-08-14 PROBLEM — Z85.3 HX: BREAST CANCER: Status: ACTIVE | Noted: 2025-08-14

## 2025-08-14 PROBLEM — I10 HTN (HYPERTENSION): Status: ACTIVE | Noted: 2025-08-14

## 2025-08-14 PROBLEM — E78.00 PURE HYPERCHOLESTEROLEMIA: Status: ACTIVE | Noted: 2025-08-14

## 2025-08-14 PROBLEM — C50.411 MALIGNANT NEOPLASM OF UPPER-OUTER QUADRANT OF RIGHT FEMALE BREAST (HCC): Status: ACTIVE | Noted: 2025-08-14

## 2025-08-14 PROBLEM — M21.611 BUNION OF GREAT TOE OF RIGHT FOOT: Status: ACTIVE | Noted: 2025-08-14

## 2025-08-14 PROBLEM — E66.3 OVERWEIGHT: Status: ACTIVE | Noted: 2025-08-14

## 2025-08-14 PROBLEM — F41.9 ANXIETY: Status: ACTIVE | Noted: 2025-08-14

## 2025-08-14 PROBLEM — H90.5 SENSORINEURAL HEARING LOSS: Status: ACTIVE | Noted: 2025-08-14

## 2025-08-14 PROBLEM — M54.2 CERVICALGIA: Status: ACTIVE | Noted: 2025-08-14

## 2025-08-14 PROBLEM — L30.9 DERMATITIS OF EXTERNAL EAR: Status: ACTIVE | Noted: 2025-08-14

## 2025-08-14 PROBLEM — M85.80 OSTEOPENIA: Status: ACTIVE | Noted: 2025-08-14

## 2025-08-14 PROBLEM — K64.4 EXTERNAL HEMORRHOIDS: Status: ACTIVE | Noted: 2025-08-14

## 2025-08-14 PROBLEM — Z78.9 STATIN INTOLERANCE: Status: ACTIVE | Noted: 2025-08-14
